# Patient Record
Sex: FEMALE | Race: WHITE
[De-identification: names, ages, dates, MRNs, and addresses within clinical notes are randomized per-mention and may not be internally consistent; named-entity substitution may affect disease eponyms.]

---

## 2019-08-02 ENCOUNTER — HOSPITAL ENCOUNTER (INPATIENT)
Dept: HOSPITAL 47 - EC | Age: 64
LOS: 5 days | Discharge: HOME HEALTH | DRG: 617 | End: 2019-08-07
Attending: HOSPITALIST | Admitting: HOSPITALIST
Payer: COMMERCIAL

## 2019-08-02 VITALS — BODY MASS INDEX: 31.1 KG/M2

## 2019-08-02 DIAGNOSIS — Z95.5: ICD-10-CM

## 2019-08-02 DIAGNOSIS — M86.671: ICD-10-CM

## 2019-08-02 DIAGNOSIS — I25.10: ICD-10-CM

## 2019-08-02 DIAGNOSIS — E11.621: ICD-10-CM

## 2019-08-02 DIAGNOSIS — Z79.82: ICD-10-CM

## 2019-08-02 DIAGNOSIS — I50.22: ICD-10-CM

## 2019-08-02 DIAGNOSIS — E11.42: ICD-10-CM

## 2019-08-02 DIAGNOSIS — E11.52: ICD-10-CM

## 2019-08-02 DIAGNOSIS — E11.69: Primary | ICD-10-CM

## 2019-08-02 DIAGNOSIS — Z88.0: ICD-10-CM

## 2019-08-02 DIAGNOSIS — E78.5: ICD-10-CM

## 2019-08-02 DIAGNOSIS — Z88.1: ICD-10-CM

## 2019-08-02 DIAGNOSIS — N17.0: ICD-10-CM

## 2019-08-02 DIAGNOSIS — N18.4: ICD-10-CM

## 2019-08-02 DIAGNOSIS — I13.0: ICD-10-CM

## 2019-08-02 DIAGNOSIS — E11.22: ICD-10-CM

## 2019-08-02 DIAGNOSIS — Z79.899: ICD-10-CM

## 2019-08-02 DIAGNOSIS — I25.2: ICD-10-CM

## 2019-08-02 DIAGNOSIS — Z23: ICD-10-CM

## 2019-08-02 DIAGNOSIS — I25.5: ICD-10-CM

## 2019-08-02 DIAGNOSIS — L97.519: ICD-10-CM

## 2019-08-02 DIAGNOSIS — Z90.49: ICD-10-CM

## 2019-08-02 DIAGNOSIS — E87.5: ICD-10-CM

## 2019-08-02 LAB
ALBUMIN SERPL-MCNC: 3.4 G/DL (ref 3.5–5)
ALP SERPL-CCNC: 64 U/L (ref 38–126)
ALT SERPL-CCNC: 19 U/L (ref 9–52)
ANION GAP SERPL CALC-SCNC: 10 MMOL/L
AST SERPL-CCNC: 30 U/L (ref 14–36)
BASOPHILS # BLD AUTO: 0 K/UL (ref 0–0.2)
BASOPHILS NFR BLD AUTO: 1 %
BUN SERPL-SCNC: 33 MG/DL (ref 7–17)
CALCIUM SPEC-MCNC: 8.8 MG/DL (ref 8.4–10.2)
CHLORIDE SERPL-SCNC: 109 MMOL/L (ref 98–107)
CO2 SERPL-SCNC: 22 MMOL/L (ref 22–30)
EOSINOPHIL # BLD AUTO: 0.1 K/UL (ref 0–0.7)
EOSINOPHIL NFR BLD AUTO: 3 %
ERYTHROCYTE [DISTWIDTH] IN BLOOD BY AUTOMATED COUNT: 4.02 M/UL (ref 3.8–5.4)
ERYTHROCYTE [DISTWIDTH] IN BLOOD: 16.4 % (ref 11.5–15.5)
GLUCOSE SERPL-MCNC: 128 MG/DL (ref 74–99)
HCT VFR BLD AUTO: 35.7 % (ref 34–46)
HGB BLD-MCNC: 11.4 GM/DL (ref 11.4–16)
LYMPHOCYTES # SPEC AUTO: 1.5 K/UL (ref 1–4.8)
LYMPHOCYTES NFR SPEC AUTO: 34 %
MCH RBC QN AUTO: 28.4 PG (ref 25–35)
MCHC RBC AUTO-ENTMCNC: 32 G/DL (ref 31–37)
MCV RBC AUTO: 88.8 FL (ref 80–100)
MONOCYTES # BLD AUTO: 0.3 K/UL (ref 0–1)
MONOCYTES NFR BLD AUTO: 7 %
NEUTROPHILS # BLD AUTO: 2.4 K/UL (ref 1.3–7.7)
NEUTROPHILS NFR BLD AUTO: 53 %
PLATELET # BLD AUTO: 206 K/UL (ref 150–450)
POTASSIUM SERPL-SCNC: 5.3 MMOL/L (ref 3.5–5.1)
PROT SERPL-MCNC: 6.5 G/DL (ref 6.3–8.2)
SODIUM SERPL-SCNC: 141 MMOL/L (ref 137–145)
WBC # BLD AUTO: 4.4 K/UL (ref 3.8–10.6)

## 2019-08-02 PROCEDURE — 83735 ASSAY OF MAGNESIUM: CPT

## 2019-08-02 PROCEDURE — 96366 THER/PROPH/DIAG IV INF ADDON: CPT

## 2019-08-02 PROCEDURE — 85027 COMPLETE CBC AUTOMATED: CPT

## 2019-08-02 PROCEDURE — 36415 COLL VENOUS BLD VENIPUNCTURE: CPT

## 2019-08-02 PROCEDURE — 90715 TDAP VACCINE 7 YRS/> IM: CPT

## 2019-08-02 PROCEDURE — 81001 URINALYSIS AUTO W/SCOPE: CPT

## 2019-08-02 PROCEDURE — 80202 ASSAY OF VANCOMYCIN: CPT

## 2019-08-02 PROCEDURE — 96365 THER/PROPH/DIAG IV INF INIT: CPT

## 2019-08-02 PROCEDURE — 99284 EMERGENCY DEPT VISIT MOD MDM: CPT

## 2019-08-02 PROCEDURE — 83036 HEMOGLOBIN GLYCOSYLATED A1C: CPT

## 2019-08-02 PROCEDURE — 96368 THER/DIAG CONCURRENT INF: CPT

## 2019-08-02 PROCEDURE — 88305 TISSUE EXAM BY PATHOLOGIST: CPT

## 2019-08-02 PROCEDURE — 80053 COMPREHEN METABOLIC PANEL: CPT

## 2019-08-02 PROCEDURE — 86140 C-REACTIVE PROTEIN: CPT

## 2019-08-02 PROCEDURE — 88311 DECALCIFY TISSUE: CPT

## 2019-08-02 PROCEDURE — 85025 COMPLETE CBC W/AUTO DIFF WBC: CPT

## 2019-08-02 PROCEDURE — 87040 BLOOD CULTURE FOR BACTERIA: CPT

## 2019-08-02 PROCEDURE — 80048 BASIC METABOLIC PNL TOTAL CA: CPT

## 2019-08-02 PROCEDURE — 76770 US EXAM ABDO BACK WALL COMP: CPT

## 2019-08-02 NOTE — XR
EXAMINATION TYPE: XR toes RT

 

DATE OF EXAM: 8/2/2019

 

COMPARISON: NONE

 

HISTORY: Pain and swelling in the fourth toe. Diabetic ulcer.

 

TECHNIQUE: 3 views of the fourth digit of the right foot were obtained

 

FINDINGS: There is diffuse circumferential soft tissue swelling of the fourth digit with osseous eros
ion and destruction of the middle and proximal phalanges. The proximal phalanx space is maintained. V
isualized portions of the third and fifth digits remain intact with old fracture deformity or arthrop
athy of the distal third metatarsal head. No subcutaneous emphysema or radiopaque foreign body.

 

IMPRESSION: Extensive soft tissue swelling of the fourth digit circumferentially and osseous erosion/
resorption of the mid and proximal phalanges of the fourth digit, likely on the basis of osteomyeliti
s.

## 2019-08-02 NOTE — ED
Skin/Abscess/FB HPI





- General


Source: patient


Mode of arrival: ambulatory


Limitations: no limitations





<Irene Parham - Last Filed: 08/03/19 01:04>





<Malcolm Brambila - Last Filed: 08/03/19 08:32>





- General


Chief complaint: Skin/Abscess/Foreign Body


Stated complaint: rt toe infection


Time Seen by Provider: 08/02/19 20:25





- History of Present Illness


Initial comments: 


63-year-old female patient with past medical history significant for diabetes 

mellitus, neuropathy, presents to the emergency department today for evaluation 

of infection to the right fourth toe.  Patient states that she has had the 

infection for quite some time.  States that she has seen both infectious disease

and a podiatrist.  Their plan was to administer IV antibiotics however her 

insurance would not cover the antibiotic.  States that the podiatrist wanted to 

amputate the toe however is going out of town.  Both the infectious disease 

doctor and her primary care physician recommended she present to the emergency 

department for further evaluation and IV antibiotics.  Patient states that there

is some pain to the toe however she does have neuropathy.  Patient denies any 

drainage from the wound surrounding the toe.  Denies any fever or chills. 

Patient denies any recent rash, shortness breath, chest pain, abdominal pain, 

nausea, vomiting, diarrhea, constipation, back pain, numbness, tingling, 

dizziness, weakness, hematuria, dysuria, urinary urgency, urinary frequency, 

headache, visual changes, or any other complaints. (Irene Parham)





- Related Data


                                Home Medications











 Medication  Instructions  Recorded  Confirmed


 


Aspirin EC [Ecotrin Low Dose] 81 mg PO DAILY 08/02/19 08/02/19


 


Furosemide [Lasix] 20 - 40 mg PO BID PRN 08/02/19 08/02/19











                                    Allergies











Allergy/AdvReac Type Severity Reaction Status Date / Time


 


ciprofloxacin [From Cipro] Allergy  Vomiting Verified 08/02/19 21:26


 


Penicillins Allergy  Unknown Verified 08/02/19 21:26





   Childhood  














Review of Systems


ROS Other: All systems not noted in ROS Statement are negative.





<Irene Parham - Last Filed: 08/03/19 01:04>


ROS Other: All systems not noted in ROS Statement are negative.





<Malcolm Brambila - Last Filed: 08/03/19 08:32>


ROS Statement: 


Those systems with pertinent positive or pertinent negative responses have been 

documented in the HPI.








Past Medical History


Past Medical History: Heart Failure, Diabetes Mellitus, Hyperlipidemia, 

Hypertension, Renal Disease


Additional Past Medical History / Comment(s): widowmaker


History of Any Multi-Drug Resistant Organisms: None Reported


Past Surgical History: Appendectomy, Heart Catheterization With Stent, 

Orthopedic Surgery


Past Psychological History: No Psychological Hx Reported


Smoking Status: Never smoker


Past Alcohol Use History: None Reported


Past Drug Use History: None Reported





<Irene Parham M - Last Filed: 08/03/19 01:04>





General Exam


Limitations: no limitations


General appearance: alert, in no apparent distress, other (This is a well-

developed, well-nourished adult female patient in no acute distress.  Vital 

signs upon presentation are temperature 98.4F, pulse 92, respirations 18, blood

pressure 141/89, pulse ox 98% on room air.)


Eye exam: Present: normal appearance, PERRL, EOMI.  Absent: scleral icterus, 

conjunctival injection, periorbital swelling


ENT exam: Present: normal exam, normal oropharynx, mucous membranes moist


Respiratory exam: Present: normal lung sounds bilaterally.  Absent: respiratory 

distress, wheezes, rales, rhonchi, stridor


Cardiovascular Exam: Present: regular rate, normal rhythm, normal heart sounds. 

Absent: systolic murmur, diastolic murmur, rubs, gallop, clicks


GI/Abdominal exam: Present: soft, normal bowel sounds.  Absent: distended, 

tenderness, guarding, rebound, rigid


Extremities exam: Present: full ROM, normal capillary refill, other (There is 

erythema and general swelling noted to the right fourth toe.  There are 

ulcerations noted at the base of the toe.  No drainage.  Skin is otherwise pink,

warm, dry.  Cap refill is less than 3 seconds.  Pedal pulses 2+ and equal 

bilaterally.).  Absent: normal inspection, tenderness, pedal edema, joint 

swelling, calf tenderness


Neurological exam: Present: alert, oriented X3, CN II-XII intact


Psychiatric exam: Present: normal affect, normal mood


Skin exam: Present: warm, dry, intact, normal color.  Absent: rash





<Irene Parham M - Last Filed: 08/03/19 01:04>





Course





                                   Vital Signs











  08/02/19 08/02/19 08/02/19





  19:57 20:03 23:45


 


Temperature 98.4 F 98.4 F 


 


Pulse Rate 92 89 87


 


Respiratory 18 16 17





Rate   


 


Blood Pressure 141/89 147/104 147/104


 


O2 Sat by Pulse 98 96 96





Oximetry   














  08/03/19 08/03/19





  01:10 01:26


 


Temperature  


 


Pulse Rate 60 88


 


Respiratory 16 





Rate  


 


Blood Pressure 157/112 153/106


 


O2 Sat by Pulse 97 





Oximetry  














Medical Decision Making





- Lab Data


Result diagrams: 


                                 08/02/19 20:59





                                 08/02/19 20:59





- Radiology Data


Radiology results: report reviewed, image reviewed





<Irene Parham - Last Filed: 08/03/19 01:04>





- Lab Data


Result diagrams: 


                                 08/03/19 06:02





                                 08/03/19 06:02





<Malcolm Brambila - Last Filed: 08/03/19 08:32>





- Medical Decision Making


63-year-old female patient presented to the emergency department today for 

evaluation of infection to the right fourth toe.  Physical examination did revea

l erythema, swelling to the toe.  X-ray was performed and showed evidence for 

osteomyelitis.  Labs reviewed and are relatively unremarkable.  Case was 

discussed with the on-call vascular surgeon Dr. Ghotra who agrees to admit 

patient for possible amputation.  Patient will be started on IV antibiotics for 

osteomyelitis.  Infectious disease will be consulted.  Dr. Hickey from [facility] group is accepting for medical admission.


 (Irene Parham)


I saw this patient in conjunction with the physician assistant.  I performed 

independent history and physical exam.  Agree with case management.


 (Malcolm Brambila)





- Lab Data





                                   Lab Results











  08/02/19 08/02/19 Range/Units





  20:59 20:59 


 


WBC  4.4   (3.8-10.6)  k/uL


 


RBC  4.02   (3.80-5.40)  m/uL


 


Hgb  11.4   (11.4-16.0)  gm/dL


 


Hct  35.7   (34.0-46.0)  %


 


MCV  88.8   (80.0-100.0)  fL


 


MCH  28.4   (25.0-35.0)  pg


 


MCHC  32.0   (31.0-37.0)  g/dL


 


RDW  16.4 H   (11.5-15.5)  %


 


Plt Count  206   (150-450)  k/uL


 


Neutrophils %  53   %


 


Lymphocytes %  34   %


 


Monocytes %  7   %


 


Eosinophils %  3   %


 


Basophils %  1   %


 


Neutrophils #  2.4   (1.3-7.7)  k/uL


 


Lymphocytes #  1.5   (1.0-4.8)  k/uL


 


Monocytes #  0.3   (0-1.0)  k/uL


 


Eosinophils #  0.1   (0-0.7)  k/uL


 


Basophils #  0.0   (0-0.2)  k/uL


 


Hypochromasia  Slight   


 


Anisocytosis  Slight   


 


Sodium   141  (137-145)  mmol/L


 


Potassium   5.3 H  (3.5-5.1)  mmol/L


 


Chloride   109 H  ()  mmol/L


 


Carbon Dioxide   22  (22-30)  mmol/L


 


Anion Gap   10  mmol/L


 


BUN   33 H  (7-17)  mg/dL


 


Creatinine   2.06 H  (0.52-1.04)  mg/dL


 


Est GFR (CKD-EPI)AfAm   29  (>60 ml/min/1.73 sqM)  


 


Est GFR (CKD-EPI)NonAf   25  (>60 ml/min/1.73 sqM)  


 


Glucose   128 H  (74-99)  mg/dL


 


Calcium   8.8  (8.4-10.2)  mg/dL


 


Total Bilirubin   0.7  (0.2-1.3)  mg/dL


 


AST   30  (14-36)  U/L


 


ALT   19  (9-52)  U/L


 


Alkaline Phosphatase   64  ()  U/L


 


Total Protein   6.5  (6.3-8.2)  g/dL


 


Albumin   3.4 L  (3.5-5.0)  g/dL














- Radiology Data


3 views of the right fourth digit of the right foot were obtained.  Report was 

reviewed in its entirety.  Impression by Dr. Castillo shows extensive soft tissue 

swelling of the fourth digit circumferentially osseous erosion/resorption of the

mid and proximal phalanges of the fourth digit, likely on the basis of 

osteomyelitis. (Irene Parham)





Disposition


Decision to Admit Reason: Admit from EC


Decision Date: 08/03/19


Decision Time: 00:45





<Irene Parham - Last Filed: 08/03/19 01:04>





<Malcolm Brambila - Last Filed: 08/03/19 08:32>


Clinical Impression: 


 Osteomyelitis of fourth toe of right foot





Disposition: ADMITTED AS IP TO THIS [facility]


Condition: Serious

## 2019-08-03 LAB
ANION GAP SERPL CALC-SCNC: 9 MMOL/L
BASOPHILS # BLD AUTO: 0 K/UL (ref 0–0.2)
BASOPHILS NFR BLD AUTO: 1 %
BUN SERPL-SCNC: 31 MG/DL (ref 7–17)
CALCIUM SPEC-MCNC: 8.8 MG/DL (ref 8.4–10.2)
CHLORIDE SERPL-SCNC: 108 MMOL/L (ref 98–107)
CO2 SERPL-SCNC: 23 MMOL/L (ref 22–30)
EOSINOPHIL # BLD AUTO: 0.1 K/UL (ref 0–0.7)
EOSINOPHIL NFR BLD AUTO: 3 %
ERYTHROCYTE [DISTWIDTH] IN BLOOD BY AUTOMATED COUNT: 4.08 M/UL (ref 3.8–5.4)
ERYTHROCYTE [DISTWIDTH] IN BLOOD: 15.9 % (ref 11.5–15.5)
GLUCOSE BLD-MCNC: 102 MG/DL (ref 75–99)
GLUCOSE BLD-MCNC: 115 MG/DL (ref 75–99)
GLUCOSE BLD-MCNC: 117 MG/DL (ref 75–99)
GLUCOSE BLD-MCNC: 132 MG/DL (ref 75–99)
GLUCOSE SERPL-MCNC: 110 MG/DL (ref 74–99)
HBA1C MFR BLD: 6.5 % (ref 4–6)
HCT VFR BLD AUTO: 36.3 % (ref 34–46)
HGB BLD-MCNC: 11.2 GM/DL (ref 11.4–16)
LYMPHOCYTES # SPEC AUTO: 1.6 K/UL (ref 1–4.8)
LYMPHOCYTES NFR SPEC AUTO: 41 %
MCH RBC QN AUTO: 27.4 PG (ref 25–35)
MCHC RBC AUTO-ENTMCNC: 30.9 G/DL (ref 31–37)
MCV RBC AUTO: 88.8 FL (ref 80–100)
MONOCYTES # BLD AUTO: 0.2 K/UL (ref 0–1)
MONOCYTES NFR BLD AUTO: 5 %
NEUTROPHILS # BLD AUTO: 1.8 K/UL (ref 1.3–7.7)
NEUTROPHILS NFR BLD AUTO: 46 %
PLATELET # BLD AUTO: 205 K/UL (ref 150–450)
POTASSIUM SERPL-SCNC: 4.6 MMOL/L (ref 3.5–5.1)
SODIUM SERPL-SCNC: 140 MMOL/L (ref 137–145)
WBC # BLD AUTO: 3.9 K/UL (ref 3.8–10.6)

## 2019-08-03 PROCEDURE — 3E0234Z INTRODUCTION OF SERUM, TOXOID AND VACCINE INTO MUSCLE, PERCUTANEOUS APPROACH: ICD-10-PCS

## 2019-08-03 RX ADMIN — HEPARIN SODIUM SCH: 5000 INJECTION, SOLUTION INTRAVENOUS; SUBCUTANEOUS at 16:35

## 2019-08-03 RX ADMIN — CEFAZOLIN SCH MLS/HR: 330 INJECTION, POWDER, FOR SOLUTION INTRAMUSCULAR; INTRAVENOUS at 00:28

## 2019-08-03 RX ADMIN — INSULIN ASPART SCH: 100 INJECTION, SOLUTION INTRAVENOUS; SUBCUTANEOUS at 13:39

## 2019-08-03 RX ADMIN — PIPERACILLIN AND TAZOBACTAM SCH MLS/HR: 3; .375 INJECTION, POWDER, FOR SOLUTION INTRAVENOUS at 08:42

## 2019-08-03 RX ADMIN — INSULIN ASPART SCH: 100 INJECTION, SOLUTION INTRAVENOUS; SUBCUTANEOUS at 08:24

## 2019-08-03 RX ADMIN — ASPIRIN 81 MG CHEWABLE TABLET SCH MG: 81 TABLET CHEWABLE at 08:42

## 2019-08-03 RX ADMIN — THERA TABS SCH EACH: TAB at 17:39

## 2019-08-03 RX ADMIN — INSULIN ASPART SCH: 100 INJECTION, SOLUTION INTRAVENOUS; SUBCUTANEOUS at 17:42

## 2019-08-03 RX ADMIN — PIPERACILLIN AND TAZOBACTAM SCH MLS/HR: 3; .375 INJECTION, POWDER, FOR SOLUTION INTRAVENOUS at 16:35

## 2019-08-03 RX ADMIN — INSULIN ASPART SCH: 100 INJECTION, SOLUTION INTRAVENOUS; SUBCUTANEOUS at 20:22

## 2019-08-03 RX ADMIN — ASPIRIN 81 MG CHEWABLE TABLET SCH MG: 81 TABLET CHEWABLE at 08:41

## 2019-08-03 RX ADMIN — CEFAZOLIN SCH MLS/HR: 330 INJECTION, POWDER, FOR SOLUTION INTRAMUSCULAR; INTRAVENOUS at 20:27

## 2019-08-03 RX ADMIN — FUROSEMIDE SCH MG: 40 TABLET ORAL at 17:39

## 2019-08-03 RX ADMIN — HEPARIN SODIUM SCH: 5000 INJECTION, SOLUTION INTRAVENOUS; SUBCUTANEOUS at 08:42

## 2019-08-03 NOTE — P.GSCN
History of Present Illness


Consult date: 08/03/19


Reason for Consult: 





Gangrenous right fourth toe, diabetes


History of present illness: 





The patient is a 63-year-old female with a past medical history of coronary 

artery disease with stents, myocardial infarction, ischemic cardiomyopathy, 

systolic congestive heart failure, diabetes mellitus, hyperlipidemia, 

hypertension and chronic kidney disease who presented to the emergency 

department for worsening infection of her right fourth toe.  She's had this 

infection for many weeks reportedly and has been seeing her primary care 

physician along with infectious disease physician.  She was unable to undergo IV

antibiotic coverage due to insurance reasons.  She saw the podiatrist's recently

this week who recommended an amputation.  It was recommended that she present to

the ER for further evaluation and IV antibiotics.  She denies any pain at this 

point patient is a fevers or chills nausea or vomiting or diarrhea.  She denies 

any pain in her legs other than her neuropathy.





Past Medical History


Past Medical History: Heart Failure, Diabetes Mellitus, Hyperlipidemia, 

Hypertension, Renal Disease


Additional Past Medical History / Comment(s): widowmaker


History of Any Multi-Drug Resistant Organisms: None Reported


Past Surgical History: Appendectomy, Heart Catheterization With Stent, 

Orthopedic Surgery


Past Anesthesia/Blood Transfusion Reactions: No Reported Reaction


Date of Last Stent Placement:: 8/18


Past Psychological History: No Psychological Hx Reported


Smoking Status: Never smoker


Past Alcohol Use History: None Reported


Past Drug Use History: None Reported





Medications and Allergies


                                Home Medications











 Medication  Instructions  Recorded  Confirmed  Type


 


Aspirin EC [Ecotrin Low Dose] 81 mg PO DAILY 08/02/19 08/02/19 History


 


Furosemide [Lasix] 20 - 40 mg PO BID PRN 08/02/19 08/02/19 History








                                    Allergies











Allergy/AdvReac Type Severity Reaction Status Date / Time


 


ciprofloxacin [From Cipro] Allergy  Vomiting Verified 08/02/19 21:26


 


Penicillins Allergy  Unknown Verified 08/02/19 21:26





   Childhood  














Surgical - Exam


                                   Vital Signs











Temp Pulse Resp BP Pulse Ox


 


 98.4 F   92   18   141/89   98 


 


 08/02/19 19:57  08/02/19 19:57  08/02/19 19:57  08/02/19 19:57  08/02/19 19:57














General: Pleasant cooperative female in no acute distress resting comfortably


HEENT: Normocephalic, atraumatic, extraocular motion intact


Heart: Regular rate and rhythm


Lungs: No respiratory distress, room air


Abdomen: Soft, nontender, nondistended


Extremities: No clubbing, cyanosis, minimal peripheral edema in the lower 

extremities


Vascular: Palpable radial, femoral, popliteal and dorsalis pedis pulses 

bilaterally


Right fourth toe with induration.  No active drainage or discharge





Results





- Labs





                                 08/03/19 06:02





                                 08/03/19 06:02


                  Abnormal Lab Results - Last 24 Hours (Table)











  08/02/19 08/02/19 08/03/19 Range/Units





  20:59 20:59 06:02 


 


Hgb     (11.4-16.0)  gm/dL


 


MCHC     (31.0-37.0)  g/dL


 


RDW  16.4 H    (11.5-15.5)  %


 


Potassium   5.3 H   (3.5-5.1)  mmol/L


 


Chloride   109 H  108 H  ()  mmol/L


 


BUN   33 H  31 H  (7-17)  mg/dL


 


Creatinine   2.06 H  2.04 H  (0.52-1.04)  mg/dL


 


Glucose   128 H  110 H  (74-99)  mg/dL


 


POC Glucose (mg/dL)     (75-99)  mg/dL


 


Albumin   3.4 L   (3.5-5.0)  g/dL














  08/03/19 08/03/19 08/03/19 Range/Units





  06:02 06:51 11:20 


 


Hgb  11.2 L    (11.4-16.0)  gm/dL


 


MCHC  30.9 L    (31.0-37.0)  g/dL


 


RDW  15.9 H    (11.5-15.5)  %


 


Potassium     (3.5-5.1)  mmol/L


 


Chloride     ()  mmol/L


 


BUN     (7-17)  mg/dL


 


Creatinine     (0.52-1.04)  mg/dL


 


Glucose     (74-99)  mg/dL


 


POC Glucose (mg/dL)   102 H  115 H  (75-99)  mg/dL


 


Albumin     (3.5-5.0)  g/dL








                                 Diabetes panel











  08/02/19 08/03/19 Range/Units





  20:59 06:02 


 


Sodium  141  140  (137-145)  mmol/L


 


Potassium  5.3 H  4.6  (3.5-5.1)  mmol/L


 


Chloride  109 H  108 H  ()  mmol/L


 


Carbon Dioxide  22  23  (22-30)  mmol/L


 


BUN  33 H  31 H  (7-17)  mg/dL


 


Creatinine  2.06 H  2.04 H  (0.52-1.04)  mg/dL


 


Glucose  128 H  110 H  (74-99)  mg/dL


 


Calcium  8.8  8.8  (8.4-10.2)  mg/dL


 


AST  30   (14-36)  U/L


 


ALT  19   (9-52)  U/L


 


Alkaline Phosphatase  64   ()  U/L


 


Total Protein  6.5   (6.3-8.2)  g/dL


 


Albumin  3.4 L   (3.5-5.0)  g/dL








                                  Calcium panel











  08/02/19 08/03/19 Range/Units





  20:59 06:02 


 


Calcium  8.8  8.8  (8.4-10.2)  mg/dL


 


Albumin  3.4 L   (3.5-5.0)  g/dL








                                 Pituitary panel











  08/02/19 08/03/19 Range/Units





  20:59 06:02 


 


Sodium  141  140  (137-145)  mmol/L


 


Potassium  5.3 H  4.6  (3.5-5.1)  mmol/L


 


Chloride  109 H  108 H  ()  mmol/L


 


Carbon Dioxide  22  23  (22-30)  mmol/L


 


BUN  33 H  31 H  (7-17)  mg/dL


 


Creatinine  2.06 H  2.04 H  (0.52-1.04)  mg/dL


 


Glucose  128 H  110 H  (74-99)  mg/dL


 


Calcium  8.8  8.8  (8.4-10.2)  mg/dL








                                  Adrenal panel











  08/02/19 08/03/19 Range/Units





  20:59 06:02 


 


Sodium  141  140  (137-145)  mmol/L


 


Potassium  5.3 H  4.6  (3.5-5.1)  mmol/L


 


Chloride  109 H  108 H  ()  mmol/L


 


Carbon Dioxide  22  23  (22-30)  mmol/L


 


BUN  33 H  31 H  (7-17)  mg/dL


 


Creatinine  2.06 H  2.04 H  (0.52-1.04)  mg/dL


 


Glucose  128 H  110 H  (74-99)  mg/dL


 


Calcium  8.8  8.8  (8.4-10.2)  mg/dL


 


Total Bilirubin  0.7   (0.2-1.3)  mg/dL


 


AST  30   (14-36)  U/L


 


ALT  19   (9-52)  U/L


 


Alkaline Phosphatase  64   ()  U/L


 


Total Protein  6.5   (6.3-8.2)  g/dL


 


Albumin  3.4 L   (3.5-5.0)  g/dL














Assessment and Plan


Assessment: 





#1 chronic right fourth toe osteomyelitis


#2 diabetes mellitus


#3 coronary artery disease, history of MI with stent


#4 chronic kidney disease


Plan: 





There is obvious chronic osteomyelitis of the right fourth toe, this time there 

is no evidence of wet gangrene.  There is no emergent need for amputation.  Will

schedule for the next few days, from our standpoint this patient could even be 

done as an outpatient pending consultant recommendations.

## 2019-08-03 NOTE — P.PN
Progress Note - Text


Progress Note Date: 08/03/19


Hospitalist Interval Note





Patient seen and examined at bedside.  Pain is controlled and her right toe.  No

nausea, vomiting, or diarrhea.  Typically her diabetes is diet controlled.  She 

was seeing a specialist for this right toe cellulitis who incised cold sides of 

her toe.  She is unsure if he is infectious disease or podiatry, and she was 

unsure of name.








Vital signs reviewed


General: non toxic, no distress, appears at stated age


Derm: Right fourth toe with extensive swelling, warmth, and erythema warm, dry


Head: atraumatic, normocephalic, symmetric


Eyes: EOMI, no lid lag, anicteric sclera


Mouth: no lip lesion, mucus membranes moist


Cardiovascular: S1S2 reg, no murmur, positive posterior tibial pulse bilateral, 


Lungs: CTA bilateral, no rhonchi, no rales , no accessory muscle use


Abdominal: soft,  nontender to palpation, no guarding, no appreciable 

organomegaly


Ext: no gross muscle atrophy, 2+ edema, no contractures


Neuro:  CN II-XI grossly intact, no focal neuro deficits


Psych: Alert, oriented, appropriate affect 





Assessment/Plan:


Right lower toe osteomyelitis-continue with Zosyn, Vanco, await ID and vascular 

surgery recommendations, pain control


Diabetes mellitus type 2-diet controlled, last A1c 6.9, continue with sliding 

scale insulin in monitoring of blood sugars





This is an update note for patient , for full note on 8/3/19 see H&P. There is 

no charge associated with this note.

## 2019-08-03 NOTE — P.HPIM
History of Present Illness


H&P Date: 08/03/19


Chief Complaint: Right toe infection





The patient is a 63-year-old female  patient with past medical history 

significant for MI, coronary artery disease with stenting 1, ischemic 

cardiomyopathy , systolic CHF with last known ejection fraction of 45%, 

diet-controlled diabetes mellitus, peripheral neuropathy  and chronic kidney 

disease presents to the emergency department today for evaluation of infection 

to the right fourth toe.  Patient states that she has had the infection for 

approximately 6 weeks, reports she was initially seen by her PCP CASSANDRA Carver and was started on antibiotics 1 of which was Bactrim, she subsequently

followed up with her cardiologist who instructed her not to take Bactrim due to 

concern for worsening kidney function.  The patient followed up with infectious 

disease consultant in [place] on Wednesday and their plan was to 

administer IV antibiotics however her insurance would not cover the antibiotic. 

On Thursday the patient saw podiatry who recommended amputation of the toe 

however is going out of town.  Both the infectious disease doctor and her 

primary care physician recommended she present to the emergency department for 

further evaluation and IV antibiotics.  Patient states that there is some pain 

to the toe however she does have neuropathy.  Patient denies any drainage from 

the wound surrounding the toe.  She denies any significant pain,  Denies any 

fever or chills. Patient denies any recent rash, shortness breath, chest pain, 

abdominal pain, nausea, vomiting, diarrhea, constipation, back pain, numbness, 

tingling, dizziness, weakness, hematuria, dysuria, urinary urgency, urinary 

frequency, headache, visual changes, or any other complaints.


In the ER the patient had a x-ray of her right foot that showed extensive soft 

tissue swelling of the right fourth digit circumferentially with osseous erosion

resumption of the mid and proximal phalanges of the 4 digit likely 

osteomyelitis.  White count was 4.4 hemoglobin is 11.4, serum potassium 5.3, 

creatinine 2.06, blood sugar 128.  Patient was started on empiric IV antibiotics

with vancomycin and Rocephin and recommended for admission





Review of Systems





Pertinent positives per HPI all other review of systems otherwise negative





Past Medical History


Past Medical History: Heart Failure, Diabetes Mellitus, Hyperlipidemia, 

Hypertension, Renal Disease


Additional Past Medical History / Comment(s): widowmaker


History of Any Multi-Drug Resistant Organisms: None Reported


Past Surgical History: Appendectomy, Heart Catheterization With Stent, 

Orthopedic Surgery


Past Anesthesia/Blood Transfusion Reactions: No Reported Reaction


Date of Last Stent Placement:: 8/18


Past Psychological History: No Psychological Hx Reported


Smoking Status: Never smoker


Past Alcohol Use History: None Reported


Past Drug Use History: None Reported





Medications and Allergies


                                Home Medications











 Medication  Instructions  Recorded  Confirmed  Type


 


Aspirin EC [Ecotrin Low Dose] 81 mg PO DAILY 08/02/19 08/02/19 History


 


Furosemide [Lasix] 20 - 40 mg PO BID PRN 08/02/19 08/02/19 History








                                    Allergies











Allergy/AdvReac Type Severity Reaction Status Date / Time


 


ciprofloxacin [From Cipro] Allergy  Vomiting Verified 08/02/19 21:26


 


Penicillins Allergy  Unknown Verified 08/02/19 21:26





   Childhood  














Physical Exam


Vitals: 


                                   Vital Signs











  Temp Pulse Pulse Resp BP BP Pulse Ox


 


 08/03/19 04:39  97.4 F L   76  16   134/85  93 L


 


 08/03/19 02:49  97.6 F   87  18   145/95  97


 


 08/03/19 02:10   78   17  138/82   97


 


 08/03/19 01:26   88    153/106  


 


 08/03/19 01:10   60   16  157/112   97


 


 08/02/19 23:45   87   17  147/104   96


 


 08/02/19 20:03  98.4 F  89   16  147/104   96


 


 08/02/19 19:57  98.4 F  92   18  141/89   98








                                Intake and Output











 08/02/19 08/02/19 08/03/19





 14:59 22:59 06:59


 


Other:   


 


  Voiding Method   Toilet


 


  # Voids   3


 


  Weight  77.111 kg 














Constitutional: No acute distress, conversant, pleasant





Eyes: Anicteric sclerae, moist conjunctiva, no lid-lag, PERRLA





ENMT: NC/AT,Oropharynx clear, no erythema, exudates





Neck:Supple, FROM, no masses, or JVD, No carotid bruits; No thyromegaly





Lungs: Clear to auscultation, Clear to percussion, Normal respiratory effort, no

accessory muscle use 





Cardiovascular: Heart regular in rate and rhythm,  No murmurs, gallops, or rubs 

no peripheral edema





Abdominal: Soft Nontender, nom distended, no guarding, no rebound or  rigidity, 

Normoactive bowel sounds No hepatomegaly, No splenomegaly,  No palpable mass No 

abdominal wall hernia noted 





Skin: Normal temperature, tone, texture, turgor, No induration No subcutaneous 

nodules, No rash, lesions, No ulcers





Extremities: Mild erythema of the right fourth toe with ulcerations at the base 

of the toe. 


 


Psychiatric: Alert and oriented to person, place and time, Appropriate affect 

Intact judgement   


      


Neuro: Muscles Strength 5/5 in all 4 extremities, Sensation to light touch 

grossly present throughout, Cranial nerves II-XII grossly intact. No focal 

sensory deficits








Results


CBC & Chem 7: 


                                 08/02/19 20:59





                                 08/02/19 20:59


Labs: 


                  Abnormal Lab Results - Last 24 Hours (Table)











  08/02/19 08/02/19 Range/Units





  20:59 20:59 


 


RDW  16.4 H   (11.5-15.5)  %


 


Potassium   5.3 H  (3.5-5.1)  mmol/L


 


Chloride   109 H  ()  mmol/L


 


BUN   33 H  (7-17)  mg/dL


 


Creatinine   2.06 H  (0.52-1.04)  mg/dL


 


Glucose   128 H  (74-99)  mg/dL


 


Albumin   3.4 L  (3.5-5.0)  g/dL














Thrombosis Risk Factor Assmnt





- Choose All That Apply


Each Factor Represents 1 point: Obesity (BMI >25)


Thrombosis Risk Factor Assessment Total Risk Factor Score: 1


Thrombosis Risk Factor Assessment Level: Low Risk





Assessment and Plan


(1) Osteomyelitis of fourth toe of right foot


Current Visit: Yes   Status: Acute   Code(s): M86.9 - OSTEOMYELITIS, UNSPECIFIED

  SNOMED Code(s): 416143139


   





(2) Type 2 diabetes mellitus


Current Visit: Yes   Status: Acute   Code(s): E11.9 - TYPE 2 DIABETES MELLITUS 

WITHOUT COMPLICATIONS   SNOMED Code(s): 22895190


   





(3) Chronic systolic CHF (congestive heart failure)


Current Visit: Yes   Status: Acute   Code(s): I50.22 - CHRONIC SYSTOLIC 

(CONGESTIVE) HEART FAILURE   SNOMED Code(s): 626766270


   





(4) Chronic kidney disease, stage IV (severe)


Current Visit: Yes   Status: Acute   Code(s): N18.4 - CHRONIC KIDNEY DISEASE, 

STAGE 4 (SEVERE)   SNOMED Code(s): 406835656


   





(5) Coronary artery disease


Current Visit: Yes   Status: Acute   Code(s): I25.10 - ATHSCL HEART DISEASE OF 

NATIVE CORONARY ARTERY W/O ANG PCTRS   SNOMED Code(s): 81828778


   





(6) Hyperkalemia


Current Visit: Yes   Status: Acute   Code(s): E87.5 - HYPERKALEMIA   SNOMED 

Code(s): 42755686


   





(7) Diabetic neuropathy


Current Visit: Yes   Status: Acute   Code(s): E11.40 - TYPE 2 DIABETES MELLITUS 

WITH DIABETIC NEUROPATHY, UNSP   SNOMED Code(s): 882828486


   


Plan: 





The patient is admitted anticipated greater than 2 midnight stay with 

osteomyelitis of the right fourth toe that has apparently has failed outpatient 

therapy with antibiotics.  The patient was recommended to have amputation by her

podiatrist but was unable to have it done.  X-ray of the foot is also suggesting

osteomyelitis, patient was started on empiric IV antibiotics with vancomycin and

Zosyn.  We'll continue to monitor electrolytes as a patient does have chronic 

kidney disease and also mild hyperkalemia on admission labs.  ID Dr. Broussard is 

been consulted for further recommendations, apparently podiatry is on call this 

weekend however ER physician contacted vascular Dr. Ghotra who is willing to 

perform amputation.  Patient is not septic and is hemodynamically stable at this

time, we'll continue diabetic diet with Accu-Cheks with correctional scale 

insulin coverage will check A1c, and CRP and we'll await further recommendations

from consultants.  Continue the follow the patient's clinical course.





CODE STATUS: Full code


Discussed plan of care with: Patient


The patient's DURABLE POWER OF :  Garret


Anticipated discharge: 2-3 days


Anticipated discharge place: Home


Prophylaxis: Heparin 


Time with Patient: Greater than 30
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
## 2019-08-04 LAB
ANION GAP SERPL CALC-SCNC: 9 MMOL/L
BASOPHILS # BLD AUTO: 0 K/UL (ref 0–0.2)
BASOPHILS NFR BLD AUTO: 1 %
BUN SERPL-SCNC: 29 MG/DL (ref 7–17)
CALCIUM SPEC-MCNC: 8.9 MG/DL (ref 8.4–10.2)
CHLORIDE SERPL-SCNC: 109 MMOL/L (ref 98–107)
CO2 SERPL-SCNC: 23 MMOL/L (ref 22–30)
EOSINOPHIL # BLD AUTO: 0.2 K/UL (ref 0–0.7)
EOSINOPHIL NFR BLD AUTO: 5 %
ERYTHROCYTE [DISTWIDTH] IN BLOOD BY AUTOMATED COUNT: 4.2 M/UL (ref 3.8–5.4)
ERYTHROCYTE [DISTWIDTH] IN BLOOD: 16.9 % (ref 11.5–15.5)
GLUCOSE BLD-MCNC: 110 MG/DL (ref 75–99)
GLUCOSE BLD-MCNC: 156 MG/DL (ref 75–99)
GLUCOSE BLD-MCNC: 157 MG/DL (ref 75–99)
GLUCOSE BLD-MCNC: 80 MG/DL (ref 75–99)
GLUCOSE SERPL-MCNC: 83 MG/DL (ref 74–99)
HCT VFR BLD AUTO: 38.5 % (ref 34–46)
HGB BLD-MCNC: 11.7 GM/DL (ref 11.4–16)
LYMPHOCYTES # SPEC AUTO: 1.5 K/UL (ref 1–4.8)
LYMPHOCYTES NFR SPEC AUTO: 44 %
MCH RBC QN AUTO: 27.9 PG (ref 25–35)
MCHC RBC AUTO-ENTMCNC: 30.4 G/DL (ref 31–37)
MCV RBC AUTO: 91.5 FL (ref 80–100)
MONOCYTES # BLD AUTO: 0.2 K/UL (ref 0–1)
MONOCYTES NFR BLD AUTO: 6 %
NEUTROPHILS # BLD AUTO: 1.4 K/UL (ref 1.3–7.7)
NEUTROPHILS NFR BLD AUTO: 41 %
PLATELET # BLD AUTO: 200 K/UL (ref 150–450)
POTASSIUM SERPL-SCNC: 4.2 MMOL/L (ref 3.5–5.1)
SODIUM SERPL-SCNC: 141 MMOL/L (ref 137–145)
WBC # BLD AUTO: 3.4 K/UL (ref 3.8–10.6)

## 2019-08-04 RX ADMIN — INSULIN ASPART SCH: 100 INJECTION, SOLUTION INTRAVENOUS; SUBCUTANEOUS at 17:15

## 2019-08-04 RX ADMIN — HEPARIN SODIUM SCH: 5000 INJECTION, SOLUTION INTRAVENOUS; SUBCUTANEOUS at 00:16

## 2019-08-04 RX ADMIN — ASPIRIN 81 MG CHEWABLE TABLET SCH MG: 81 TABLET CHEWABLE at 07:47

## 2019-08-04 RX ADMIN — PIPERACILLIN AND TAZOBACTAM SCH MLS/HR: 3; .375 INJECTION, POWDER, FOR SOLUTION INTRAVENOUS at 00:18

## 2019-08-04 RX ADMIN — INSULIN ASPART SCH UNIT: 100 INJECTION, SOLUTION INTRAVENOUS; SUBCUTANEOUS at 12:39

## 2019-08-04 RX ADMIN — HEPARIN SODIUM SCH: 5000 INJECTION, SOLUTION INTRAVENOUS; SUBCUTANEOUS at 16:07

## 2019-08-04 RX ADMIN — INSULIN ASPART SCH: 100 INJECTION, SOLUTION INTRAVENOUS; SUBCUTANEOUS at 07:46

## 2019-08-04 RX ADMIN — PIPERACILLIN AND TAZOBACTAM SCH MLS/HR: 3; .375 INJECTION, POWDER, FOR SOLUTION INTRAVENOUS at 23:32

## 2019-08-04 RX ADMIN — FUROSEMIDE SCH MG: 40 TABLET ORAL at 07:47

## 2019-08-04 RX ADMIN — HEPARIN SODIUM SCH: 5000 INJECTION, SOLUTION INTRAVENOUS; SUBCUTANEOUS at 07:46

## 2019-08-04 RX ADMIN — THERA TABS SCH EACH: TAB at 12:39

## 2019-08-04 RX ADMIN — PIPERACILLIN AND TAZOBACTAM SCH MLS/HR: 3; .375 INJECTION, POWDER, FOR SOLUTION INTRAVENOUS at 07:47

## 2019-08-04 RX ADMIN — PIPERACILLIN AND TAZOBACTAM SCH MLS/HR: 3; .375 INJECTION, POWDER, FOR SOLUTION INTRAVENOUS at 16:19

## 2019-08-04 RX ADMIN — INSULIN ASPART SCH UNIT: 100 INJECTION, SOLUTION INTRAVENOUS; SUBCUTANEOUS at 20:10

## 2019-08-04 NOTE — P.PN
Subjective


Progress Note Date: 08/04/19


Patient reported that she is doing better she is waiting for her if the 

evaluation of the right fourth toe by the vascular surgeon.  She stated that she

never had pain but her right fourth toe has been swollen and red lately.  

Patient reported things are not getting worse rather it is getting better and fe

eling.  Patient denies chest pain, palpitation, diaphoresis, fever, chills, 

headaches, dizziness and denies rest of the review system.  Nurses reported that

patient has been seen by Dr. Aguilar and patient will be scheduled for possible 

amputation of the right fourth toe due to chronic osteomyelitis and no response 

to conservative management.  Patient renal function were reviewed and noted CKD 

stage IV which is stable.








Objective





- Vital Signs


Vital signs: 


                                   Vital Signs











Temp  98.3 F   08/04/19 08:00


 


Pulse  84   08/04/19 08:00


 


Resp  16   08/04/19 08:00


 


BP  145/90   08/04/19 08:00


 


Pulse Ox  96   08/04/19 08:00








                                 Intake & Output











 08/03/19 08/04/19 08/04/19





 18:59 06:59 18:59


 


Intake Total  590 


 


Balance  590 


 


Intake:   


 


  Oral  590 


 


Other:   


 


  Voiding Method Toilet Toilet Toilet


 


  # Voids 3 2 














- Constitutional


General appearance: Present: cooperative, no acute distress





- EENT


Eyes: Present: EOMI, normal appearance


ENT: Present: hearing grossly normal, NA/AT





- Respiratory


Respiratory: bilateral: CTA, negative: rales, rhonchi, wheezing





- Cardiovascular


Rhythm: regular


Heart sounds: normal: S1, S2


Abnormal Heart Sounds: Absent: systolic murmur, diastolic murmur, S3 Gallop, S4 

Gallop





- Gastrointestinal


General gastrointestinal: Present: normal bowel sounds, soft.  Absent: 

distended, rigid, tenderness





- Neurologic


Neurologic: Present: CNII-XII intact.  Absent: focal deficits





- Psychiatric


Psychiatric: Present: A&O x's 3, appropriate affect, intact judgment & insight





- Allied health notes


Allied health notes reviewed: nursing





- Labs


CBC & Chem 7: 


                                 08/04/19 06:50





                                 08/04/19 06:50


Labs: 


                  Abnormal Lab Results - Last 24 Hours (Table)











  08/03/19 08/03/19 08/03/19 Range/Units





  06:05 11:20 16:57 


 


WBC     (3.8-10.6)  k/uL


 


MCHC     (31.0-37.0)  g/dL


 


RDW     (11.5-15.5)  %


 


Chloride     ()  mmol/L


 


BUN     (7-17)  mg/dL


 


Creatinine     (0.52-1.04)  mg/dL


 


POC Glucose (mg/dL)   115 H  132 H  (75-99)  mg/dL


 


Hemoglobin A1c  6.5 H    (4.0-6.0)  %














  08/03/19 08/04/19 08/04/19 Range/Units





  20:05 06:50 06:50 


 


WBC   3.4 L   (3.8-10.6)  k/uL


 


MCHC   30.4 L   (31.0-37.0)  g/dL


 


RDW   16.9 H   (11.5-15.5)  %


 


Chloride    109 H  ()  mmol/L


 


BUN    29 H  (7-17)  mg/dL


 


Creatinine    2.15 H  (0.52-1.04)  mg/dL


 


POC Glucose (mg/dL)  117 H    (75-99)  mg/dL


 


Hemoglobin A1c     (4.0-6.0)  %








                      Microbiology - Last 24 Hours (Table)











 08/03/19 01:34 Blood Culture - Preliminary





 Blood    No Growth after 24 hours


 


 08/02/19 21:00 Blood Culture - Preliminary





 Blood    No Growth after 24 hours














Assessment and Plan


(1) Osteomyelitis of fourth toe of right foot


Current Visit: Yes   Status: Acute   Priority: High   Code(s): M86.9 - 

OSTEOMYELITIS, UNSPECIFIED   SNOMED Code(s): 446810842


   





(2) Type 2 diabetes mellitus


Current Visit: Yes   Status: Acute   Priority: High   Code(s): E11.9 - TYPE 2 

DIABETES MELLITUS WITHOUT COMPLICATIONS   SNOMED Code(s): 49372957


   





(3) Chronic kidney disease, stage IV (severe)


Current Visit: Yes   Status: Acute   Priority: Medium   Code(s): N18.4 - CHRONIC

KIDNEY DISEASE, STAGE 4 (SEVERE)   SNOMED Code(s): 700644592


   





(4) Coronary artery disease


Current Visit: No   Status: Acute   Priority: Medium   Code(s): I25.10 - ATHSCL 

HEART DISEASE OF NATIVE CORONARY ARTERY W/O ANG PCTRS   SNOMED Code(s): 24599843


   





(5) Diabetic neuropathy


Current Visit: No   Status: Acute   Priority: Medium   Code(s): E11.40 - TYPE 2 

DIABETES MELLITUS WITH DIABETIC NEUROPATHY, UNSP   SNOMED Code(s): 332743147


   





(6) Chronic systolic CHF (congestive heart failure)


Current Visit: No   Status: Acute   Priority: Medium   Code(s): I50.22 - CHRONIC

SYSTOLIC (CONGESTIVE) HEART FAILURE   SNOMED Code(s): 686524848


   


Plan: 





Patient is clinically improving I will continue current management for now.  

Antibiotics will be continued and it is controlling the infection which is not 

spreading further.  Rescue surgery is going to reevaluate patient tomorrow and 

then decide for amputation.  Patient blood sugar will be made monitored 

regularly and sliding scale coverage will be continued.  No changes in the 

current management as suggested this point in time.


Time with Patient: Less than 30

## 2019-08-04 NOTE — P.PN
Subjective


Progress Note Date: 08/04/19


Principal diagnosis: 





Right 4th toe osteo





Patient seen and examined.  No new events overnight.  No complaints.  Toe feels 

unstable when walking per patient.  No fevers, chills, nausea, chest pain or 

sob.  Currently on antibiotics.  





Objective





- Vital Signs


Vital signs: 


                                   Vital Signs











Temp  98.3 F   08/04/19 08:00


 


Pulse  84   08/04/19 08:00


 


Resp  16   08/04/19 08:00


 


BP  145/90   08/04/19 08:00


 


Pulse Ox  96   08/04/19 08:00








                                 Intake & Output











 08/03/19 08/04/19 08/04/19





 18:59 06:59 18:59


 


Intake Total  590 


 


Balance  590 


 


Intake:   


 


  Oral  590 


 


Other:   


 


  Voiding Method Toilet Toilet Toilet


 


  # Voids 3 2 














- Exam





right 4th toe swollen, minimal tenderness.  Minimal erythema.


Palpable DP pulses bilaterally.  1+ edema RLE.





- Constitutional


General appearance: Present: average body habitus, cooperative





- EENT


Eyes: Present: PERRLA





- Respiratory


Respiratory: bilateral: CTA





- Cardiovascular


Rhythm: regular





- Gastrointestinal


General gastrointestinal: Absent: distended





- Psychiatric


Psychiatric: Present: A&O x's 3, appropriate affect, intact judgment & insight





- Labs


CBC & Chem 7: 


                                 08/04/19 06:50





                                 08/04/19 06:50


Labs: 


                  Abnormal Lab Results - Last 24 Hours (Table)











  08/03/19 08/03/19 08/03/19 Range/Units





  06:05 11:20 16:57 


 


WBC     (3.8-10.6)  k/uL


 


MCHC     (31.0-37.0)  g/dL


 


RDW     (11.5-15.5)  %


 


Chloride     ()  mmol/L


 


BUN     (7-17)  mg/dL


 


Creatinine     (0.52-1.04)  mg/dL


 


POC Glucose (mg/dL)   115 H  132 H  (75-99)  mg/dL


 


Hemoglobin A1c  6.5 H    (4.0-6.0)  %














  08/03/19 08/04/19 08/04/19 Range/Units





  20:05 06:50 06:50 


 


WBC   3.4 L   (3.8-10.6)  k/uL


 


MCHC   30.4 L   (31.0-37.0)  g/dL


 


RDW   16.9 H   (11.5-15.5)  %


 


Chloride    109 H  ()  mmol/L


 


BUN    29 H  (7-17)  mg/dL


 


Creatinine    2.15 H  (0.52-1.04)  mg/dL


 


POC Glucose (mg/dL)  117 H    (75-99)  mg/dL


 


Hemoglobin A1c     (4.0-6.0)  %








                      Microbiology - Last 24 Hours (Table)











 08/03/19 01:34 Blood Culture - Preliminary





 Blood    No Growth after 24 hours


 


 08/02/19 21:00 Blood Culture - Preliminary





 Blood    No Growth after 24 hours














Assessment and Plan


Assessment: 





1.  chronic right fourth toe osteomyelitis


2.  diabetes mellitus


3.  coronary artery disease, history of MI with stent


4.  chronic kidney disease





Plan: 





OR scheduled for tuesday with Dr. Aguilar for amputation of the 4th toe.  continue

antibiotics per ID.

## 2019-08-05 LAB
ANION GAP SERPL CALC-SCNC: 8 MMOL/L
BUN SERPL-SCNC: 31 MG/DL (ref 7–17)
CALCIUM SPEC-MCNC: 8.7 MG/DL (ref 8.4–10.2)
CHLORIDE SERPL-SCNC: 107 MMOL/L (ref 98–107)
CO2 SERPL-SCNC: 26 MMOL/L (ref 22–30)
GLUCOSE BLD-MCNC: 145 MG/DL (ref 75–99)
GLUCOSE BLD-MCNC: 150 MG/DL (ref 75–99)
GLUCOSE BLD-MCNC: 91 MG/DL (ref 75–99)
GLUCOSE BLD-MCNC: 97 MG/DL (ref 75–99)
GLUCOSE SERPL-MCNC: 93 MG/DL (ref 74–99)
POTASSIUM SERPL-SCNC: 4.8 MMOL/L (ref 3.5–5.1)
SODIUM SERPL-SCNC: 141 MMOL/L (ref 137–145)

## 2019-08-05 RX ADMIN — PIPERACILLIN AND TAZOBACTAM SCH MLS/HR: 3; .375 INJECTION, POWDER, FOR SOLUTION INTRAVENOUS at 20:43

## 2019-08-05 RX ADMIN — FUROSEMIDE SCH MG: 40 TABLET ORAL at 07:57

## 2019-08-05 RX ADMIN — PIPERACILLIN AND TAZOBACTAM SCH MLS/HR: 3; .375 INJECTION, POWDER, FOR SOLUTION INTRAVENOUS at 09:07

## 2019-08-05 RX ADMIN — HEPARIN SODIUM SCH: 5000 INJECTION, SOLUTION INTRAVENOUS; SUBCUTANEOUS at 00:11

## 2019-08-05 RX ADMIN — THERA TABS SCH EACH: TAB at 13:05

## 2019-08-05 RX ADMIN — INSULIN ASPART SCH UNIT: 100 INJECTION, SOLUTION INTRAVENOUS; SUBCUTANEOUS at 17:57

## 2019-08-05 RX ADMIN — HEPARIN SODIUM SCH: 5000 INJECTION, SOLUTION INTRAVENOUS; SUBCUTANEOUS at 16:50

## 2019-08-05 RX ADMIN — INSULIN ASPART SCH: 100 INJECTION, SOLUTION INTRAVENOUS; SUBCUTANEOUS at 20:14

## 2019-08-05 RX ADMIN — INSULIN ASPART SCH UNIT: 100 INJECTION, SOLUTION INTRAVENOUS; SUBCUTANEOUS at 13:05

## 2019-08-05 RX ADMIN — INSULIN ASPART SCH: 100 INJECTION, SOLUTION INTRAVENOUS; SUBCUTANEOUS at 07:25

## 2019-08-05 RX ADMIN — HEPARIN SODIUM SCH: 5000 INJECTION, SOLUTION INTRAVENOUS; SUBCUTANEOUS at 07:55

## 2019-08-05 RX ADMIN — CEFAZOLIN SCH MLS/HR: 330 INJECTION, POWDER, FOR SOLUTION INTRAMUSCULAR; INTRAVENOUS at 07:57

## 2019-08-05 NOTE — P.PN
Subjective


Progress Note Date: 08/05/19


Patient seen and examined.  No complaints.  Feeling a little volume overloaded 

and her legs








Objective





- Vital Signs


Vital signs: 


                                   Vital Signs











Temp  97.5 F L  08/05/19 05:00


 


Pulse  79   08/05/19 05:00


 


Resp  18   08/05/19 05:00


 


BP  147/98   08/05/19 05:00


 


Pulse Ox  97   08/05/19 05:00








                                 Intake & Output











 08/04/19 08/05/19 08/05/19





 18:59 06:59 18:59


 


Intake Total  510 


 


Balance  510 


 


Intake:   


 


  Intake, IV Titration  60 





  Amount   


 


    Sodium Chloride 0.9% 1,  60 





    000 ml @ 20 mls/hr IV .   





    Q24H [facility] Rx#:[id number]   


 


  Oral  450 


 


Other:   


 


  Voiding Method Toilet Toilet 


 


  # Voids 4 1 














- Exam





No acute distress, sitting up comfortably


Heart regular


Lungs clear no respiratory distress


Abdomen soft


Extremities mild edema, right fourth toe erythematous as previous





- Labs


CBC & Chem 7: 


                                 08/04/19 06:50





                                 08/05/19 06:54


Labs: 


                  Abnormal Lab Results - Last 24 Hours (Table)











  08/04/19 08/04/19 08/04/19 Range/Units





  11:19 17:03 20:02 


 


BUN     (7-17)  mg/dL


 


Creatinine     (0.52-1.04)  mg/dL


 


POC Glucose (mg/dL)  156 H  110 H  157 H  (75-99)  mg/dL














  08/05/19 Range/Units





  06:54 


 


BUN  31 H  (7-17)  mg/dL


 


Creatinine  2.32 H  (0.52-1.04)  mg/dL


 


POC Glucose (mg/dL)   (75-99)  mg/dL








                      Microbiology - Last 24 Hours (Table)











 08/03/19 01:34 Blood Culture - Preliminary





 Blood    No Growth after 48 hours


 


 08/02/19 21:00 Blood Culture - Preliminary





 Blood    No Growth after 48 hours














Assessment and Plan


Assessment: 





#1 chronic right fourth toe osteomyelitis


#2 diabetes mellitus


#3 coronary artery disease, history of MI with stent


#4 chronic kidney disease


Plan: 





There is obvious chronic osteomyelitis of the right fourth toe, still no 

evidence of wet gangrene.  Plan for OR tomorrow.  From a vascular standpoint 

patient likely could be discharged home on the same day if other consultants in 

agreement.

## 2019-08-05 NOTE — P.PN
Subjective


Progress Note Date: 08/05/19


Patient reported that she is feeling better and is scheduled for right fourth 

toe amputation tomorrow morning. Patient denies chest pain, palpitation, 

diaphoresis, fever, chills, headaches, dizziness and denies rest of the review 

system. Patient renal function were reviewed and noted CKD stage IV which is now

slightly worse than yesterday.








Objective





- Vital Signs


Vital signs: 


                                   Vital Signs











Temp  97.5 F L  08/05/19 05:00


 


Pulse  79   08/05/19 05:00


 


Resp  18   08/05/19 05:00


 


BP  147/98   08/05/19 05:00


 


Pulse Ox  97   08/05/19 05:00








                                 Intake & Output











 08/04/19 08/05/19 08/05/19





 18:59 06:59 18:59


 


Intake Total  510 


 


Balance  510 


 


Intake:   


 


  Intake, IV Titration  60 





  Amount   


 


    Sodium Chloride 0.9% 1,  60 





    000 ml @ 20 mls/hr IV .   





    Q24H [facility] Rx#:[id number]   


 


  Oral  450 


 


Other:   


 


  Voiding Method Toilet Toilet 


 


  # Voids 4 1 














- Constitutional


General appearance: Present: cooperative, no acute distress





- EENT


Eyes: Present: EOMI, normal appearance


ENT: Present: hearing grossly normal, NA/AT





- Respiratory


Respiratory: bilateral: CTA, negative: rales, rhonchi, wheezing





- Cardiovascular


Rhythm: regular


Heart sounds: normal: S1, S2


Abnormal Heart Sounds: Absent: systolic murmur, diastolic murmur, S3 Gallop, S4 

Gallop





- Gastrointestinal


General gastrointestinal: Present: normal bowel sounds, soft.  Absent: 

distended, rigid, tenderness





- Neurologic


Neurologic: Present: CNII-XII intact.  Absent: focal deficits





- Psychiatric


Psychiatric: Present: A&O x's 3, appropriate affect, intact judgment & insight





- Allied health notes


Allied health notes reviewed: nursing





- Labs


CBC & Chem 7: 


                                 08/04/19 06:50





                                 08/05/19 06:54


Labs: 


                  Abnormal Lab Results - Last 24 Hours (Table)











  08/04/19 08/04/19 08/04/19 Range/Units





  11:19 17:03 20:02 


 


BUN     (7-17)  mg/dL


 


Creatinine     (0.52-1.04)  mg/dL


 


POC Glucose (mg/dL)  156 H  110 H  157 H  (75-99)  mg/dL














  08/05/19 Range/Units





  06:54 


 


BUN  31 H  (7-17)  mg/dL


 


Creatinine  2.32 H  (0.52-1.04)  mg/dL


 


POC Glucose (mg/dL)   (75-99)  mg/dL








                      Microbiology - Last 24 Hours (Table)











 08/03/19 01:34 Blood Culture - Preliminary





 Blood    No Growth after 48 hours


 


 08/02/19 21:00 Blood Culture - Preliminary





 Blood    No Growth after 48 hours














Assessment and Plan


(1) Osteomyelitis of fourth toe of right foot


Narrative/Plan: 


Scheduled for amputation tomorrow morning, will continue current management and 

IV antibiotics for now.


Current Visit: Yes   Status: Acute   Priority: High   Code(s): M86.9 - 

OSTEOMYELITIS, UNSPECIFIED   SNOMED Code(s): 448730934


   





(2) Type 2 diabetes mellitus


Narrative/Plan: 


Blood sugar will be monitored and sliding scale coverage will be continued.


Current Visit: Yes   Status: Acute   Priority: High   Code(s): E11.9 - TYPE 2 

DIABETES MELLITUS WITHOUT COMPLICATIONS   SNOMED Code(s): 93271443


   





(3) Chronic kidney disease, stage IV (severe)


Narrative/Plan: 


I will check renal function again in the morning and if continues to decline 

then we will request nephrology evaluation.


Current Visit: Yes   Status: Acute   Priority: Medium   Code(s): N18.4 - CHRONIC

KIDNEY DISEASE, STAGE 4 (SEVERE)   SNOMED Code(s): 002189506


   





(4) Coronary artery disease


Current Visit: No   Status: Acute   Priority: Medium   Code(s): I25.10 - ATHSCL 

HEART DISEASE OF NATIVE CORONARY ARTERY W/O ANG PCTRS   SNOMED Code(s): 46180676


   





(5) Diabetic neuropathy


Current Visit: No   Status: Acute   Priority: Medium   Code(s): E11.40 - TYPE 2 

DIABETES MELLITUS WITH DIABETIC NEUROPATHY, UNSP   SNOMED Code(s): 391429128


   





(6) Chronic systolic CHF (congestive heart failure)


Current Visit: No   Status: Acute   Priority: Medium   Code(s): I50.22 - CHRONIC

SYSTOLIC (CONGESTIVE) HEART FAILURE   SNOMED Code(s): 249225862


   


Time with Patient: Less than 30

## 2019-08-06 VITALS — TEMPERATURE: 97.4 F

## 2019-08-06 VITALS — RESPIRATION RATE: 18 BRPM

## 2019-08-06 LAB
ANION GAP SERPL CALC-SCNC: 9 MMOL/L
BUN SERPL-SCNC: 33 MG/DL (ref 7–17)
CALCIUM SPEC-MCNC: 8.9 MG/DL (ref 8.4–10.2)
CHLORIDE SERPL-SCNC: 107 MMOL/L (ref 98–107)
CO2 SERPL-SCNC: 26 MMOL/L (ref 22–30)
ERYTHROCYTE [DISTWIDTH] IN BLOOD BY AUTOMATED COUNT: 4.09 M/UL (ref 3.8–5.4)
ERYTHROCYTE [DISTWIDTH] IN BLOOD: 16.2 % (ref 11.5–15.5)
GLUCOSE BLD-MCNC: 113 MG/DL (ref 75–99)
GLUCOSE BLD-MCNC: 209 MG/DL (ref 75–99)
GLUCOSE BLD-MCNC: 85 MG/DL (ref 75–99)
GLUCOSE BLD-MCNC: 92 MG/DL (ref 75–99)
GLUCOSE BLD-MCNC: 99 MG/DL (ref 75–99)
GLUCOSE SERPL-MCNC: 90 MG/DL (ref 74–99)
HCT VFR BLD AUTO: 35.9 % (ref 34–46)
HGB BLD-MCNC: 11.4 GM/DL (ref 11.4–16)
MCH RBC QN AUTO: 27.9 PG (ref 25–35)
MCHC RBC AUTO-ENTMCNC: 31.7 G/DL (ref 31–37)
MCV RBC AUTO: 87.9 FL (ref 80–100)
PLATELET # BLD AUTO: 225 K/UL (ref 150–450)
POTASSIUM SERPL-SCNC: 5.2 MMOL/L (ref 3.5–5.1)
SODIUM SERPL-SCNC: 142 MMOL/L (ref 137–145)
WBC # BLD AUTO: 4.8 K/UL (ref 3.8–10.6)

## 2019-08-06 PROCEDURE — 0Y6V0Z0 DETACHMENT AT RIGHT 4TH TOE, COMPLETE, OPEN APPROACH: ICD-10-PCS

## 2019-08-06 RX ADMIN — HEPARIN SODIUM SCH: 5000 INJECTION, SOLUTION INTRAVENOUS; SUBCUTANEOUS at 08:24

## 2019-08-06 RX ADMIN — INSULIN ASPART SCH: 100 INJECTION, SOLUTION INTRAVENOUS; SUBCUTANEOUS at 07:30

## 2019-08-06 RX ADMIN — HEPARIN SODIUM SCH: 5000 INJECTION, SOLUTION INTRAVENOUS; SUBCUTANEOUS at 01:06

## 2019-08-06 RX ADMIN — INSULIN ASPART SCH UNIT: 100 INJECTION, SOLUTION INTRAVENOUS; SUBCUTANEOUS at 20:50

## 2019-08-06 RX ADMIN — CEFAZOLIN SCH: 330 INJECTION, POWDER, FOR SOLUTION INTRAMUSCULAR; INTRAVENOUS at 01:49

## 2019-08-06 RX ADMIN — PIPERACILLIN AND TAZOBACTAM SCH: 3; .375 INJECTION, POWDER, FOR SOLUTION INTRAVENOUS at 14:30

## 2019-08-06 RX ADMIN — INSULIN ASPART SCH: 100 INJECTION, SOLUTION INTRAVENOUS; SUBCUTANEOUS at 17:13

## 2019-08-06 RX ADMIN — THERA TABS SCH: TAB at 14:30

## 2019-08-06 RX ADMIN — FUROSEMIDE SCH MG: 40 TABLET ORAL at 16:13

## 2019-08-06 RX ADMIN — ASPIRIN 81 MG CHEWABLE TABLET SCH: 81 TABLET CHEWABLE at 08:26

## 2019-08-06 RX ADMIN — PIPERACILLIN AND TAZOBACTAM SCH MLS/HR: 3; .375 INJECTION, POWDER, FOR SOLUTION INTRAVENOUS at 20:49

## 2019-08-06 RX ADMIN — HEPARIN SODIUM SCH: 5000 INJECTION, SOLUTION INTRAVENOUS; SUBCUTANEOUS at 14:31

## 2019-08-06 RX ADMIN — CEFAZOLIN SCH MLS/HR: 330 INJECTION, POWDER, FOR SOLUTION INTRAMUSCULAR; INTRAVENOUS at 08:25

## 2019-08-06 RX ADMIN — INSULIN ASPART SCH: 100 INJECTION, SOLUTION INTRAVENOUS; SUBCUTANEOUS at 14:30

## 2019-08-06 NOTE — P.PN
Subjective


Progress Note Date: 08/06/19


Patient was seen prior to her surgery in the morning.  Patient denies any 

complaints states she is feeling better.  Patient denies fever, chills, 

headache, dizziness, chest pain, palpitation, diaphoresis, nausea, vomiting and 

denies rest of the review system.  Nurses reported no current issues with her.  

Patient was asking that after the surgery if she could be discharged home on 

oral antibiotics.  It was explained to the patient that it all depends on the 

use of anesthesia agent whether she will be able to go home today or we need to 

monitor her overnight and be discharged in the morning if she remains stable.








Objective





- Vital Signs


Vital signs: 


                                   Vital Signs











Temp  97.4 F L  08/06/19 16:23


 


Pulse  80   08/06/19 16:23


 


Resp  17   08/06/19 16:23


 


BP  145/88   08/06/19 16:23


 


Pulse Ox  94 L  08/06/19 16:23








                                 Intake & Output











 08/05/19 08/06/19 08/06/19





 18:59 06:59 18:59


 


Intake Total 100  570


 


Output Total   1


 


Balance 100  569


 


Intake:   


 


  IV   100


 


  Intake, IV Titration 100  470





  Amount   


 


    Piperacillin-Tazobactam 3 100  100





    .375 gm In Sodium   





    Chloride 0.9% 100 ml @ 25   





    mls/hr IVPB Q12HR [facility] Rx   





    #:[id number]   


 


    Sodium Chloride 0.9% 1,   120





    000 ml @ 20 mls/hr IV .   





    Q24H [facility] Rx#:[id number]   


 


    Vancomycin 1,500 mg In   250





    Sodium Chloride 0.9% 250   





    ml @ 125 mls/hr IVPB ONCE   





    [facility] Rx#:[id number]   


 


Output:   


 


  Estimated Blood Loss   1


 


Other:   


 


  Voiding Method  Toilet 


 


  # Voids 4 2 3














- Constitutional


General appearance: Present: cooperative, no acute distress





- Respiratory


Respiratory: bilateral: CTA, negative: rales, rhonchi, wheezing





- Cardiovascular


Rhythm: regular


Heart sounds: normal: S1, S2


Abnormal Heart Sounds: Absent: systolic murmur, diastolic murmur, S3 Gallop, S4 

Gallop





- Gastrointestinal


General gastrointestinal: Present: normal bowel sounds, soft.  Absent: 

distended, rigid, tenderness





- Neurologic


Neurologic: Present: CNII-XII intact.  Absent: focal deficits





- Psychiatric


Psychiatric: Present: A&O x's 3, appropriate affect, intact judgment & insight





- Allied health notes


Allied health notes reviewed: nursing





- Labs


CBC & Chem 7: 


                                 08/06/19 07:50





                                 08/06/19 07:50


Labs: 


                  Abnormal Lab Results - Last 24 Hours (Table)











  08/05/19 08/06/19 08/06/19 Range/Units





  16:54 07:50 07:50 


 


RDW   16.2 H   (11.5-15.5)  %


 


Potassium    5.2 H  (3.5-5.1)  mmol/L


 


BUN    33 H  (7-17)  mg/dL


 


Creatinine    2.41 H  (0.52-1.04)  mg/dL


 


POC Glucose (mg/dL)  145 H    (75-99)  mg/dL








                      Microbiology - Last 24 Hours (Table)











 08/03/19 01:34 Blood Culture - Preliminary





 Blood    No Growth after 72 hours


 


 08/02/19 21:00 Blood Culture - Preliminary





 Blood    No Growth after 72 hours














Assessment and Plan


(1) Osteomyelitis of fourth toe of right foot


Current Visit: Yes   Status: Acute   Priority: High   Code(s): M86.9 - 

OSTEOMYELITIS, UNSPECIFIED   SNOMED Code(s): 586343893


   





(2) Type 2 diabetes mellitus


Current Visit: Yes   Status: Acute   Priority: High   Code(s): E11.9 - TYPE 2 

DIABETES MELLITUS WITHOUT COMPLICATIONS   SNOMED Code(s): 63429086


   





(3) Chronic kidney disease, stage IV (severe)


Current Visit: Yes   Status: Acute   Priority: Medium   Code(s): N18.4 - CHRONIC

KIDNEY DISEASE, STAGE 4 (SEVERE)   SNOMED Code(s): 806710137


   





(4) Coronary artery disease


Current Visit: No   Status: Acute   Priority: Medium   Code(s): I25.10 - ATHSCL 

HEART DISEASE OF NATIVE CORONARY ARTERY W/O ANG PCTRS   SNOMED Code(s): 56586660


   





(5) Diabetic neuropathy


Current Visit: No   Status: Acute   Priority: Medium   Code(s): E11.40 - TYPE 2 

DIABETES MELLITUS WITH DIABETIC NEUROPATHY, UNSP   SNOMED Code(s): 036442291


   





(6) Chronic systolic CHF (congestive heart failure)


Current Visit: No   Status: Acute   Priority: Medium   Code(s): I50.22 - CHRONIC

SYSTOLIC (CONGESTIVE) HEART FAILURE   SNOMED Code(s): 562641342


   


Plan: 


In the morning patient's CBC and basic panel was pending but later on came back 

which showed progressive worsening of her renal function although still in a CKD

stage IV category and her antibiotics are being managed by pharmacy per renal 

function and dozing protocols.  Due to her CKD and progressive decline in renal 

function I will refer her to Dr. Cisneros for his expert opinion and 

recommendations.  Patient underwent amputation of the right fourth toe due to ch

ronic osteoarthritis and tolerated the procedure well.  Procedure was done under

IV sedation.  Postoperatively patient was still sleepy and drowsy when she was 

transferred to the unit and it was recommended to monitor her over 24 hours/at 

least overnight and disposition can be discussed and arranged in the morning.  

Patient verbalizes understanding and agree with the plan.  I will continue 

current management plan for now and patient will be reevaluated in next 24 hours

for further disposition issues.





Time with Patient: Less than 30

## 2019-08-06 NOTE — P.OP
Description of Procedure: 


SURGEON:  Sue Aguilar DO


ASSISTANT:  None


PREOPERATIVE DIAGNOSIS: Chronic osteomyelitis right fourth toe


POSTOPERATIVE DIAGNOSIS: Same


OPERATION:   Right fourth toe amputation


ANESTHESIA:  IV sedation per anesthesia


ESTIMATED BLOOD LOSS:  5 mL


SPECIMENS REMOVED:  Right fourth toe


COMPLICATIONS:  None





OPERATIVE FINDINGS:  The patient is a 63-year-old female who has chronic 

osteomyelitis of her right fourth toe that has been non-resolving with 

antibiotics therefore she presents today for amputation.  Risks and benefits 

were discussed.  She seemingly understands and is willing to proceed





DESCRIPTION OF PROCEDURE:  This patient was brought to the operating room, and 

given IV sedation. The operative foot was prepped and draped in sterile manner. 

An incision was made at the base of the fourth toe deepened to skin and fascia 

on plantar and dorsal aspect until we reached the head of the metatarsal bone.  

There was chronic osteomyelitis of the digit itself but the head of the 

metatarsal appeared intact with adequate bony cortex. The head of the metatarsal

was  from the fourth toe.. The tendons were divided in plantar and 

dorsal aspects. The fourth toe was removed.  Hemostasis was achieved with 

electrocautery the head of the fourth metatarsal was taken back with the [place]er.

Base of the wound looked clean, and the wound was copiously irrigated with 

saline. Tissue was approximated with 3-0 Vicryl interrupted sutures and skin was

reapproximated with interrupted sutures of 3-0 nylon Hemostasis was well 

controlled and pressure dressing was applied. The patient tolerated the 

procedure well.

## 2019-08-07 VITALS — DIASTOLIC BLOOD PRESSURE: 88 MMHG | HEART RATE: 83 BPM | SYSTOLIC BLOOD PRESSURE: 137 MMHG

## 2019-08-07 LAB
ANION GAP SERPL CALC-SCNC: 11 MMOL/L
BUN SERPL-SCNC: 34 MG/DL (ref 7–17)
CALCIUM SPEC-MCNC: 8.8 MG/DL (ref 8.4–10.2)
CHLORIDE SERPL-SCNC: 106 MMOL/L (ref 98–107)
CO2 SERPL-SCNC: 24 MMOL/L (ref 22–30)
GLUCOSE BLD-MCNC: 188 MG/DL (ref 75–99)
GLUCOSE BLD-MCNC: 95 MG/DL (ref 75–99)
GLUCOSE SERPL-MCNC: 84 MG/DL (ref 74–99)
HYALINE CASTS UR QL AUTO: 1 /LPF (ref 0–2)
MAGNESIUM SPEC-SCNC: 1.7 MG/DL (ref 1.6–2.3)
PH UR: 5.5 [PH] (ref 5–8)
POTASSIUM SERPL-SCNC: 4.5 MMOL/L (ref 3.5–5.1)
PROT UR QL: (no result)
RBC UR QL: 1 /HPF (ref 0–5)
SODIUM SERPL-SCNC: 141 MMOL/L (ref 137–145)
SP GR UR: 1.01 (ref 1–1.03)
SQUAMOUS UR QL AUTO: <1 /HPF (ref 0–4)
UROBILINOGEN UR QL STRIP: <2 MG/DL (ref ?–2)
VANCOMYCIN SERPL-MCNC: 24.6 UG/ML

## 2019-08-07 RX ADMIN — CEFAZOLIN SCH: 330 INJECTION, POWDER, FOR SOLUTION INTRAMUSCULAR; INTRAVENOUS at 01:14

## 2019-08-07 RX ADMIN — HEPARIN SODIUM SCH: 5000 INJECTION, SOLUTION INTRAVENOUS; SUBCUTANEOUS at 08:29

## 2019-08-07 RX ADMIN — ASPIRIN 81 MG CHEWABLE TABLET SCH MG: 81 TABLET CHEWABLE at 08:29

## 2019-08-07 RX ADMIN — INSULIN ASPART SCH UNIT: 100 INJECTION, SOLUTION INTRAVENOUS; SUBCUTANEOUS at 13:20

## 2019-08-07 RX ADMIN — HEPARIN SODIUM SCH: 5000 INJECTION, SOLUTION INTRAVENOUS; SUBCUTANEOUS at 01:13

## 2019-08-07 RX ADMIN — PIPERACILLIN AND TAZOBACTAM SCH MLS/HR: 3; .375 INJECTION, POWDER, FOR SOLUTION INTRAVENOUS at 08:29

## 2019-08-07 RX ADMIN — THERA TABS SCH EACH: TAB at 13:22

## 2019-08-07 RX ADMIN — INSULIN ASPART SCH: 100 INJECTION, SOLUTION INTRAVENOUS; SUBCUTANEOUS at 08:50

## 2019-08-07 RX ADMIN — FUROSEMIDE SCH MG: 40 TABLET ORAL at 08:29

## 2019-08-07 NOTE — P.DS
Providers


Date of admission: 


08/02/19 23:49





Expected date of discharge: 08/07/19


Attending physician: 


Harry Hickey MD





Consults: 





                                        





08/02/19 23:50


Consult Physician Routine 


   Consulting Provider: Malcolm Ghotra


   Consult Reason/Comments: Poss right toe amputation/Osteomyelitis


   Do you want consulting provider notified?: Already Contacted





08/03/19 01:05


Consult Physician Routine 


   Consulting Provider: Jermain Broussard


   Consult Reason/Comments: Infection/osteomyelitis right fourth toe


   Do you want consulting provider notified?: Yes





08/06/19 16:36


Consult Physician Routine 


   Consulting Provider: Brian Cisneros


   Consult Reason/Comments: Worening CKD-IV


   Do you want consulting provider notified?: Yes











Primary care physician: 


[facility]





[facility] Course: 





63-year-old female with PMH of CAD post stent, systolic CHF with EF 45%, 

diabetes mellitus, chronic kidney disease presents to the ED for right fourth 

toe pain.  Patient had seen her PCP and was started on a series of antibiotics. 

The infection had been going for 6 weeks.  Infectious disease was consulted in 

the outpatient setting and patient was advised for the need of IV antibiotics 

but insurance would not cover antibiotics.  Foot x-ray showed extensive soft 

tissue swelling and signs of osteomyelitis.  Vascular surgery was consulted and 

patient underwent amputation of the fourth toe on 08/06/2019.  She was started 

on Zosyn and vancomycin for concerns of osteomyelitis during her hospitalization

with infectious disease on board.





Patient was seen and examined.  No acute events overnight.  Patient reports 

well-controlled pain.  Looking for to going home.





General: [non toxic], [no distress], [appears at stated age]


Derm: [warm], [dry]


Head: [atraumatic], [normocephalic], [symmetric]


Eyes: [EOMI], [no lid lag], [anicteric sclera]


Cardiovascular: [S1S2 reg], [no murmur]


Lungs: [CTA bilateral], [no rhonchi, no rales] , [no accessory muscle use]


Ext: [no gross muscle atrophy], [no edema], [no contractures], [right foot 

wrapped dressing clean dry and intact]


Neuro: [no focal neuro deficits]


Psych: [Alert], [oriented], [appropriate affect] 





Osteomyelitis of the fourth right toe


Type 2 diabetes mellitus


Acute kidney injury on chronic kidney disease


Coronary artery disease


Diabetic neuropathy


Chronic systolic CHF





Patient is post amputation of the fourth to 08/06/2019.  Plans: We'll discuss 

with ID regarding the choice of antibiotics on discharge.


Point-of-care glucose 188.  Treated with sliding scale while hospitalized.  

Plans: Continue diabetic diet at home.


Creatinine is slightly worsening from 2.06 on admission to 2.25 on discharge.  

Nephrology consulted, recommends renal ultrasound.  Plans: Ultrasound to be done

in the outpatient setting.  Repeat BMP in 3 days.


Plans: Continue aspirin.


Plans: Adequate glycemic control.  Pain control with Tylenol.


Stable.  Plans: Follow-up PCP for initiation of beta blocker and possible ACE 

inhibitor with improvement in her renal function.





Patient underwent amputation of fourth toe 08/06/2019.  Cleared by vascular 

surgery.  We'll discuss with infectious disease regarding antibiotic choice for 

discharge.  DC today.


Pertinent Studies: 





Foot x-ray


Procedures: 





Amputation of the fourth toe right foot


Patient Condition at Discharge: Serious





Plan - Discharge Summary


Discharge Rx Participant: No


New Discharge Prescriptions: 


Continue


   Aspirin EC [Ecotrin Low Dose] 81 mg PO DAILY


   Furosemide [Lasix] 20 - 40 mg PO BID PRN


     PRN Reason: Edema


Discharge Medication List





Aspirin EC [Ecotrin Low Dose] 81 mg PO DAILY 08/02/19 [History]


Furosemide [Lasix] 20 - 40 mg PO BID PRN 08/02/19 [History]








Follow up Appointment(s)/Referral(s): 


Moises Soler DO [Primary Care Provider] - 1-2 days


Seu Aguilar DO [STAFF PHYSICIAN] - 2 Weeks


[facility], [NON-STAFF] - 


Jermain Broussard MD [STAFF PHYSICIAN] - 1 Week


Activity/Diet/Wound Care/Special Instructions: 


May shower over incision. No soaking. Use post op shoe until seen in office. 

Cover with gauze dressing at other times for padding/comfort





Follow-up with her primary care provider within 1-2 days of discharge.


Follow-up with infectious disease within 1 week of discharge.


Follow-up with vascular surgery within 1 week of discharge.


Please take all medications as advised.





Please see your family doctor with regards to initiating beta blocker and ACE 

inhibitor for  systolic CHF. Please see your family doctor for renal ultrasound 

regarding her elevated creatinine and chronic kidney disease.  


Discharge Disposition: HOME SELF-CARE

## 2019-08-07 NOTE — P.PN
Progress Note - Text


Progress Note Date: 08/07/19





Patient s/e. No complaints. feeling well. pain controlled. 





R 4th toe amp site clean, dry. dressing changed. 





POD#1 R 4th toe amp





continue previous orders. 


RAMIREZ hose BLE


post op shoe to ADRIANO bauman DC from vascular standpoint. 


F/U 2 weeks

## 2019-08-07 NOTE — US
EXAMINATION TYPE: US kidneys/renal and bladder

 

DATE OF EXAM: 8/7/2019

 

COMPARISON: NONE

 

CLINICAL HISTORY: cecy. no pain.  Patient voided before ultrasound exam.

 

EXAM MEASUREMENTS:

 

Right Kidney:  10.5 x 5.2 x 4.2 cm

Left Kidney: 10.2 x 4.8 x 5.9 cm

 

 

 

Right Kidney: No hydronephrosis or masses seen  

Left Kidney: No hydronephrosis or masses seen  

Bladder: Nondistended due to just voided

**Bilateral Jets not seen

 

 

There is no evidence for hydronephrosis at this point in time.  No nephrolithiasis is seen.  No salma
s are identified.  The urinary bladder is anechoic.  Bilateral ureteral jets are seen.

 

 

 

IMPRESSION:

No hydronephrosis nor nephrolithiasis.
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
## 2019-10-02 ENCOUNTER — HOSPITAL ENCOUNTER (OUTPATIENT)
Dept: HOSPITAL 47 - LABWHC1 | Age: 64
Discharge: HOME | End: 2019-10-02
Attending: THORACIC SURGERY (CARDIOTHORACIC VASCULAR SURGERY)
Payer: COMMERCIAL

## 2019-10-02 DIAGNOSIS — L97.514: Primary | ICD-10-CM

## 2019-10-02 DIAGNOSIS — E11.40: ICD-10-CM

## 2019-10-02 PROCEDURE — 84134 ASSAY OF PREALBUMIN: CPT

## 2019-10-02 PROCEDURE — 36415 COLL VENOUS BLD VENIPUNCTURE: CPT
